# Patient Record
Sex: FEMALE | Race: WHITE | NOT HISPANIC OR LATINO | URBAN - METROPOLITAN AREA
[De-identification: names, ages, dates, MRNs, and addresses within clinical notes are randomized per-mention and may not be internally consistent; named-entity substitution may affect disease eponyms.]

---

## 2018-04-16 ENCOUNTER — OFFICE VISIT - HEALTHEAST (OUTPATIENT)
Dept: FAMILY MEDICINE | Facility: CLINIC | Age: 43
End: 2018-04-16

## 2018-04-16 DIAGNOSIS — H02.9 EYELID ABNORMALITY: ICD-10-CM

## 2021-05-30 ENCOUNTER — RECORDS - HEALTHEAST (OUTPATIENT)
Dept: ADMINISTRATIVE | Facility: CLINIC | Age: 46
End: 2021-05-30

## 2021-06-01 VITALS — WEIGHT: 190 LBS

## 2021-06-17 NOTE — PROGRESS NOTES
Assessment and Plan     Emily was seen today for poss stye.    Diagnoses and all orders for this visit:    Eyelid abnormality         HPI     Chief Complaint   Patient presents with     poss stye     to the top right eyelid. pt noticed it 1x week. no eye vision issuse. Admit itchiness to the eyelid.        Emily Bhatt is a 42 y.o. female seen today for a bump on her upper eyelid.  Approximately a week ago she noticed some swelling of her lid which progressed to a visible bump in the center of the upper lid.  She does notice a small amount of discharge present around her eye in the morning, but none occurring throughout the day.  She denies any irritation of the globe or foreign body sensation.  She has been using warm wet compresses several times per day since her symptoms began.  Denies changes in vision.  No similar episodes in the past aside from a few sties when she was a child.     No current outpatient prescriptions on file.     No current facility-administered medications for this visit.         Reviewed and updated: medical history, medications and allergies.     Review of Systems     General: Denies fever, chills, fatigue.  Eyes: Bump on the upper lid.  Denies eye pain.  Denies changes in vision.     Objective     Vitals:    04/16/18 0748   BP: 122/74   Pulse: 83   Resp: 14   Temp: 98.3  F (36.8  C)   TempSrc: Oral   SpO2: 99%   Weight: 190 lb (86.2 kg)        Reviewed vital signs.  General: Appears calm, comfortable. Answers questions quickly and appropriately with clear speech. No apparent distress.  Skin: Pink, warm, dry.  Eyes: PERRL. EOMI. Sclerae are not injected.  There is a 4 mm nontender bump present in the center of her upper lid.  No surrounding erythema or induration.  Inversion of the lid does not reveal lesion or foreign body on the inner surface.  There are no foreign bodies present on the surface of the globe or in the conjunctival sacs.  HENT: Normocephalic, atraumatic.  Neck:  Supple.  Heart: Strong, regular radial pulse.  Lungs: Normal respiratory effort.  Neuro: Memory and cognition appear normal. Normal gait.  Psych: Mood and affect appear normal.      Medical Decision-Making     Emily is a well-appearing 42-year-old female who presents with approximately 1 week of a bump on her right upper eyelid.  It does not have the erythematous appearance of a stye.  The appearance is similar to a chalazion, however given its recent onset this does not seem likely.  I suggested referral to an ophthalmologist, she would like to hold off on this as she is currently not insured.  She will call [employer] to see if there are any free eye exams available in the area, otherwise she will postpone follow-up until she is able to obtain insurance.  I believe this to be reasonable, however we did review situations in which she would follow-up immediately regardless of her insurance status as noted under patient instructions.    Reviewed red flags that would trigger a prompt return to the clinic as noted below under patient instructions.  She expressed understanding of these directions and is in agreement with the plan.     Patient Instructions     Patient Instructions   I do not see any sign of acute infection in your eyelid.  I also do not see any sign of a foreign body in your eye.    Please follow-up with an eye doctor when you are able to do so.  If your eyelid becomes painful, her eye becomes irritated, or the swelling continues to increase, please follow-up with an eye doctor as soon as possible.    Please return to the clinic if you notice any of the following:    Fever / chills.    Redness in your eyelid.    Change in vision.        Discussed benefit vs risk of medications, dosing, side effects.  Patient was able to verbalize understanding.  After visit summary was provided for patient.     Mark Salinas PA-C

## 2025-05-22 ENCOUNTER — OFFICE VISIT (OUTPATIENT)
Dept: FAMILY MEDICINE | Facility: CLINIC | Age: 50
End: 2025-05-22
Payer: COMMERCIAL

## 2025-05-22 ENCOUNTER — ANCILLARY PROCEDURE (OUTPATIENT)
Dept: MAMMOGRAPHY | Facility: CLINIC | Age: 50
End: 2025-05-22
Payer: COMMERCIAL

## 2025-05-22 VITALS
RESPIRATION RATE: 20 BRPM | SYSTOLIC BLOOD PRESSURE: 122 MMHG | WEIGHT: 176.5 LBS | TEMPERATURE: 98.4 F | DIASTOLIC BLOOD PRESSURE: 66 MMHG | BODY MASS INDEX: 29.41 KG/M2 | OXYGEN SATURATION: 98 % | HEIGHT: 65 IN | HEART RATE: 95 BPM

## 2025-05-22 DIAGNOSIS — Z12.4 CERVICAL CANCER SCREENING: Primary | ICD-10-CM

## 2025-05-22 DIAGNOSIS — R35.0 URINARY FREQUENCY: ICD-10-CM

## 2025-05-22 DIAGNOSIS — E66.811 CLASS 1 OBESITY WITHOUT SERIOUS COMORBIDITY WITH BODY MASS INDEX (BMI) OF 31.0 TO 31.9 IN ADULT, UNSPECIFIED OBESITY TYPE: ICD-10-CM

## 2025-05-22 DIAGNOSIS — Z12.31 VISIT FOR SCREENING MAMMOGRAM: ICD-10-CM

## 2025-05-22 LAB
ALBUMIN UR-MCNC: NEGATIVE MG/DL
APPEARANCE UR: CLEAR
ATRIAL RATE - MUSE: 82 BPM
BILIRUB UR QL STRIP: NEGATIVE
COLOR UR AUTO: YELLOW
DIASTOLIC BLOOD PRESSURE - MUSE: NORMAL MMHG
GLUCOSE UR STRIP-MCNC: NEGATIVE MG/DL
HGB UR QL STRIP: NEGATIVE
INTERPRETATION ECG - MUSE: NORMAL
KETONES UR STRIP-MCNC: NEGATIVE MG/DL
LEUKOCYTE ESTERASE UR QL STRIP: NEGATIVE
NITRATE UR QL: NEGATIVE
P AXIS - MUSE: 70 DEGREES
PH UR STRIP: 8 [PH] (ref 5–8)
PR INTERVAL - MUSE: 172 MS
QRS DURATION - MUSE: 96 MS
QT - MUSE: 390 MS
QTC - MUSE: 455 MS
R AXIS - MUSE: -27 DEGREES
SP GR UR STRIP: 1.02 (ref 1–1.03)
SYSTOLIC BLOOD PRESSURE - MUSE: NORMAL MMHG
T AXIS - MUSE: 55 DEGREES
UROBILINOGEN UR STRIP-ACNC: 0.2 E.U./DL
VENTRICULAR RATE- MUSE: 82 BPM

## 2025-05-22 PROCEDURE — 77063 BREAST TOMOSYNTHESIS BI: CPT

## 2025-05-22 NOTE — PROGRESS NOTES
Assessment & Plan     Cervical cancer screening  Completed today. Some friability of the cervix. Will await results, but if normal repeat PAP in 1 year secondary to bleeding and limited past screening. Discussed with patient. Declines STD screening today.   - HPV and Gynecologic Cytology Panel - Recommended Age 30 - 65 Years    Urinary frequency  Ongoing frequency and sensation of incomplete emptying for 2 to 3 weeks.  No other symptoms of urinary tract infection.  Will check UA today.  UA is not infectious,  not concerning overall.  Could consider overactive bladder/weak pelvic floor as cuase of symptoms.  Recommend Kegels. Also could trial miralax to ensure constipation is not contributing.  Recent BMP without concern. Follow-up PRN or if worsening symptoms, fever, pain with urination, loss of urine.   - UA Macroscopic with reflex to Microscopic and Culture - Lab Collect    Class 1 obesity without serious comorbidity with body mass index (BMI) of 31.0 to 31.9 in adult, unspecified obesity type  On 6 weeks of phentermine so far, no side effects of concern. EKG is without concern today.  Will continue until 3 months total.  Did discuss at length today that at the end of the 3 months if eating habits and exercise habits are the same as before starting weight would likely come back.  Patient verbalized understanding.  Following completion of medication follow-up PRN. Could consider weight management referral if needed. Nutrition referral placed.       Subjective   Emily is a 49 year old, presenting for the following health issues:  Follow Up  (Weight: medication working well no side effects ) and Pap         5/22/2025     9:52 AM   Additional Questions   Roomed by RADHA Robledo     History of Present Illness       Reason for visit:  Overdue physical   She is taking medications regularly.        No chest pain. Sleeping well. Less appetite, and eating less. More energy. Portions are smaller. She was exercising  "before, and has been consistent with this.     Feels like she is peeing all the time and not emptying all the way. No blood in the urine. No pain. No lower back pain. No fevers. Feeling okay otherwise. She does experience leaking sometimes, but not often.     No STD concerns. No discharge or drainage.         Objective    /66 (BP Location: Right arm, Patient Position: Sitting, Cuff Size: Adult Regular)   Pulse 95   Temp 98.4  F (36.9  C) (Oral)   Resp 20   Ht 1.64 m (5' 4.57\")   Wt 80.1 kg (176 lb 8 oz)   LMP 05/05/2025 (Approximate)   SpO2 98%   BMI 29.76 kg/m    Body mass index is 29.76 kg/m .  Physical Exam  Constitutional:       General: She is not in acute distress.  Cardiovascular:      Rate and Rhythm: Normal rate and regular rhythm.      Heart sounds: Normal heart sounds.   Abdominal:      Hernia: There is no hernia in the left inguinal area or right inguinal area.   Genitourinary:     Labia:         Right: No rash, tenderness or lesion.         Left: No rash, tenderness or lesion.       Vagina: No signs of injury. No vaginal discharge or tenderness.      Cervix: Friability present. No cervical motion tenderness, discharge, lesion or erythema.      Uterus: Not deviated.       Adnexa:         Right: No mass, tenderness or fullness.          Left: No mass, tenderness or fullness.     Lymphadenopathy:      Lower Body: No right inguinal adenopathy. No left inguinal adenopathy.   Neurological:      General: No focal deficit present.      Mental Status: She is alert. Mental status is at baseline.       At the end of the visit, I confirmed understanding of what was discussed. Emily has no further questions or concerns that were brought up at this time.      Kimberly Cool DNP, APRN, FNP-C    "

## 2025-05-23 ENCOUNTER — RESULTS FOLLOW-UP (OUTPATIENT)
Dept: PEDIATRICS | Facility: CLINIC | Age: 50
End: 2025-05-23

## 2025-05-23 DIAGNOSIS — R87.610 ASCUS OF CERVIX WITH NEGATIVE HIGH RISK HPV: Primary | ICD-10-CM

## 2025-05-26 ENCOUNTER — PATIENT OUTREACH (OUTPATIENT)
Dept: CARE COORDINATION | Facility: CLINIC | Age: 50
End: 2025-05-26
Payer: COMMERCIAL

## 2025-05-28 ENCOUNTER — PATIENT OUTREACH (OUTPATIENT)
Dept: FAMILY MEDICINE | Facility: CLINIC | Age: 50
End: 2025-05-28
Payer: COMMERCIAL

## 2025-05-28 LAB
BKR AP ASSOCIATED HPV REPORT: NORMAL
BKR LAB AP GYN ADEQUACY: NORMAL
BKR LAB AP GYN INTERPRETATION: NORMAL
BKR LAB AP LMP: NORMAL
BKR LAB AP PREVIOUS ABNORMAL: NORMAL
PATH REPORT.COMMENTS IMP SPEC: NORMAL
PATH REPORT.COMMENTS IMP SPEC: NORMAL
PATH REPORT.RELEVANT HX SPEC: NORMAL

## 2025-05-28 NOTE — RESULT ENCOUNTER NOTE
Cc'd to provider as FYI only due to hx.  No response needed unless prefer a change in plan. Plan per office visit.     Normal pap/Neg HPV letter sent through [facility] results. Next pap smear and HPV due in 1 year.     Dayna Maldonado, RN, BSN  Pap Tracking Nurse

## 2025-05-29 ENCOUNTER — ANCILLARY PROCEDURE (OUTPATIENT)
Dept: MAMMOGRAPHY | Facility: CLINIC | Age: 50
End: 2025-05-29
Payer: COMMERCIAL

## 2025-05-29 DIAGNOSIS — R92.8 ABNORMAL MAMMOGRAM: ICD-10-CM

## 2025-05-29 PROCEDURE — G0279 TOMOSYNTHESIS, MAMMO: HCPCS

## 2025-05-29 PROCEDURE — 76642 ULTRASOUND BREAST LIMITED: CPT | Mod: RT
